# Patient Record
Sex: FEMALE | URBAN - METROPOLITAN AREA
[De-identification: names, ages, dates, MRNs, and addresses within clinical notes are randomized per-mention and may not be internally consistent; named-entity substitution may affect disease eponyms.]

---

## 2023-11-19 ENCOUNTER — HOSPITAL ENCOUNTER (EMERGENCY)
Age: 33
Discharge: HOME | End: 2023-11-19
Attending: EMERGENCY MEDICINE
Payer: MEDICAID

## 2023-11-19 ENCOUNTER — APPOINTMENT (OUTPATIENT)
Dept: ULTRASOUND IMAGING | Age: 33
End: 2023-11-19
Payer: MEDICAID

## 2023-11-19 VITALS
RESPIRATION RATE: 18 BRPM | TEMPERATURE: 97.2 F | OXYGEN SATURATION: 100 % | SYSTOLIC BLOOD PRESSURE: 109 MMHG | HEIGHT: 68 IN | HEART RATE: 73 BPM | WEIGHT: 158.73 LBS | BODY MASS INDEX: 24.06 KG/M2 | DIASTOLIC BLOOD PRESSURE: 71 MMHG

## 2023-11-19 DIAGNOSIS — O20.0 THREATENED MISCARRIAGE IN EARLY PREGNANCY: Primary | ICD-10-CM

## 2023-11-19 LAB
ABO + RH BLD: NORMAL
ANION GAP SERPL CALCULATED.3IONS-SCNC: 8 MMOL/L (ref 9–17)
B-HCG SERPL EIA 3RD IS-ACNC: 1241 MIU/ML
BASOPHILS # BLD: 0 K/UL (ref 0–0.2)
BASOPHILS NFR BLD: 0 % (ref 0–2)
BILIRUB UR QL STRIP: NEGATIVE
BUN SERPL-MCNC: 8 MG/DL (ref 6–20)
CALCIUM SERPL-MCNC: 8.6 MG/DL (ref 8.6–10.4)
CANDIDA SPECIES: NEGATIVE
CHARACTER UR: ABNORMAL
CHLORIDE SERPL-SCNC: 103 MMOL/L (ref 98–107)
CLARITY UR: CLEAR
CO2 SERPL-SCNC: 24 MMOL/L (ref 20–31)
COLOR UR: YELLOW
CREAT SERPL-MCNC: 0.7 MG/DL (ref 0.5–0.9)
EOSINOPHIL # BLD: 0.1 K/UL (ref 0–0.4)
EOSINOPHILS RELATIVE PERCENT: 2 % (ref 1–4)
EPI CELLS #/AREA URNS HPF: ABNORMAL /HPF (ref 0–5)
ERYTHROCYTE [DISTWIDTH] IN BLOOD BY AUTOMATED COUNT: 12.5 % (ref 12.5–15.4)
GARDNERELLA VAGINALIS: NEGATIVE
GFR SERPL CREATININE-BSD FRML MDRD: >60 ML/MIN/1.73M2
GLUCOSE SERPL-MCNC: 92 MG/DL (ref 70–99)
GLUCOSE UR STRIP-MCNC: NEGATIVE MG/DL
HCT VFR BLD AUTO: 37.7 % (ref 36–46)
HGB BLD-MCNC: 13 G/DL (ref 12–16)
HGB UR QL STRIP.AUTO: ABNORMAL
KETONES UR STRIP-MCNC: ABNORMAL MG/DL
LEUKOCYTE ESTERASE UR QL STRIP: NEGATIVE
LYMPHOCYTES NFR BLD: 1.7 K/UL (ref 1–4.8)
LYMPHOCYTES RELATIVE PERCENT: 34 % (ref 24–44)
MCH RBC QN AUTO: 32.2 PG (ref 26–34)
MCHC RBC AUTO-ENTMCNC: 34.5 G/DL (ref 31–37)
MCV RBC AUTO: 93.1 FL (ref 80–100)
MONOCYTES NFR BLD: 0.3 K/UL (ref 0.1–1.2)
MONOCYTES NFR BLD: 6 % (ref 2–11)
NEUTROPHILS NFR BLD: 58 % (ref 36–66)
NEUTS SEG NFR BLD: 3 K/UL (ref 1.8–7.7)
NITRITE UR QL STRIP: NEGATIVE
PH UR STRIP: 6 [PH] (ref 5–8)
PLATELET # BLD AUTO: 253 K/UL (ref 140–450)
PMV BLD AUTO: 8.2 FL (ref 6–12)
POTASSIUM SERPL-SCNC: 4 MMOL/L (ref 3.7–5.3)
PROT UR STRIP-MCNC: NEGATIVE MG/DL
RBC # BLD AUTO: 4.05 M/UL (ref 4–5.2)
RBC #/AREA URNS HPF: ABNORMAL /HPF (ref 0–2)
SODIUM SERPL-SCNC: 135 MMOL/L (ref 135–144)
SOURCE: NORMAL
SP GR UR STRIP: 1.02 (ref 1–1.03)
TRICHOMONAS: NEGATIVE
UROBILINOGEN UR STRIP-ACNC: NORMAL EU/DL (ref 0–1)
WBC #/AREA URNS HPF: ABNORMAL /HPF (ref 0–5)
WBC OTHER # BLD: 5.1 K/UL (ref 3.5–11)

## 2023-11-19 PROCEDURE — 36415 COLL VENOUS BLD VENIPUNCTURE: CPT

## 2023-11-19 PROCEDURE — 87491 CHLMYD TRACH DNA AMP PROBE: CPT

## 2023-11-19 PROCEDURE — 81001 URINALYSIS AUTO W/SCOPE: CPT

## 2023-11-19 PROCEDURE — 87510 GARDNER VAG DNA DIR PROBE: CPT

## 2023-11-19 PROCEDURE — 86901 BLOOD TYPING SEROLOGIC RH(D): CPT

## 2023-11-19 PROCEDURE — 80048 BASIC METABOLIC PNL TOTAL CA: CPT

## 2023-11-19 PROCEDURE — 99284 EMERGENCY DEPT VISIT MOD MDM: CPT

## 2023-11-19 PROCEDURE — 87480 CANDIDA DNA DIR PROBE: CPT

## 2023-11-19 PROCEDURE — 87591 N.GONORRHOEAE DNA AMP PROB: CPT

## 2023-11-19 PROCEDURE — 85025 COMPLETE CBC W/AUTO DIFF WBC: CPT

## 2023-11-19 PROCEDURE — 86900 BLOOD TYPING SEROLOGIC ABO: CPT

## 2023-11-19 PROCEDURE — 87660 TRICHOMONAS VAGIN DIR PROBE: CPT

## 2023-11-19 PROCEDURE — 84702 CHORIONIC GONADOTROPIN TEST: CPT

## 2023-11-19 PROCEDURE — 76817 TRANSVAGINAL US OBSTETRIC: CPT

## 2023-11-19 ASSESSMENT — PAIN - FUNCTIONAL ASSESSMENT: PAIN_FUNCTIONAL_ASSESSMENT: NONE - DENIES PAIN

## 2023-11-19 NOTE — DISCHARGE INSTRUCTIONS
Home. Follow up with Dr. Frank Rabago. Call the office on Monday to be seen and repeat ultrasound. Pelvic rest. Return for bleeding that worsens, nausea, vomiting, severe abdominal pain, or any other concerns.

## 2023-11-20 LAB
C TRACH DNA SPEC QL PROBE+SIG AMP: NEGATIVE
N GONORRHOEA DNA SPEC QL PROBE+SIG AMP: NEGATIVE
SPECIMEN DESCRIPTION: NORMAL

## 2023-11-21 ENCOUNTER — HOSPITAL ENCOUNTER (OUTPATIENT)
Age: 33
Discharge: HOME | End: 2023-11-21
Payer: MEDICAID

## 2023-11-21 DIAGNOSIS — O20.0 THREATENED MISCARRIAGE IN EARLY PREGNANCY: ICD-10-CM

## 2023-11-21 LAB — B-HCG SERPL EIA 3RD IS-ACNC: 1516 MIU/ML

## 2023-11-21 PROCEDURE — 36415 COLL VENOUS BLD VENIPUNCTURE: CPT

## 2023-11-21 PROCEDURE — 84702 CHORIONIC GONADOTROPIN TEST: CPT

## 2025-05-13 ENCOUNTER — OFFICE VISIT (OUTPATIENT)
Age: 35
End: 2025-05-13
Payer: MEDICAID

## 2025-05-13 VITALS
TEMPERATURE: 98.3 F | DIASTOLIC BLOOD PRESSURE: 77 MMHG | HEART RATE: 104 BPM | BODY MASS INDEX: 27.47 KG/M2 | SYSTOLIC BLOOD PRESSURE: 112 MMHG | OXYGEN SATURATION: 99 % | WEIGHT: 175 LBS | HEIGHT: 67 IN

## 2025-05-13 DIAGNOSIS — D23.9 DERMATOFIBROMA: Primary | ICD-10-CM

## 2025-05-13 DIAGNOSIS — D22.9 MULTIPLE BENIGN NEVI: ICD-10-CM

## 2025-05-13 DIAGNOSIS — L21.9 SEBORRHEIC DERMATITIS: ICD-10-CM

## 2025-05-13 PROCEDURE — 99204 OFFICE O/P NEW MOD 45 MIN: CPT | Performed by: PHYSICIAN ASSISTANT

## 2025-05-13 PROCEDURE — 99203 OFFICE O/P NEW LOW 30 MIN: CPT | Performed by: PHYSICIAN ASSISTANT

## 2025-05-13 RX ORDER — KETOCONAZOLE 20 MG/ML
SHAMPOO, SUSPENSION TOPICAL
Qty: 120 ML | Refills: 5 | Status: SHIPPED | OUTPATIENT
Start: 2025-05-13

## 2025-05-13 NOTE — PROGRESS NOTES
Dermatology Patient Note  [facility]  [facility] DERMATOLOGY  [address] [place] [address]  Dept: [phone]  Dept Fax: [phone]      VISITDATE: 5/13/2025   REFERRING PROVIDER: No ref. provider found      Katie Stallings is a 34 y.o. female  who presents today in the office for:    New Patient (New patient presents today for a mole check. No hx of skin cancer.)      HISTORY OF PRESENT ILLNESS:  As above.    MEDICAL PROBLEMS:  There are no active problems to display for this patient.      CURRENT MEDICATIONS:   Current Outpatient Medications   Medication Sig Dispense Refill    ketoconazole (NIZORAL) 2 % shampoo Apply 2-3 times weekly to scalp, leave on for five minutes prior to washing off. 120 mL 5     No current facility-administered medications for this visit.       ALLERGIES:   No Known Allergies    SOCIAL HISTORY:  Social History     Tobacco Use    Smoking status: Former     Types: Cigarettes    Smokeless tobacco: Never   Substance Use Topics    Alcohol use: Yes     Alcohol/week: 5.0 standard drinks of alcohol     Types: 5 Standard drinks or equivalent per week       Pertinent ROS:  Review of Systems  Skin: Denies any new changing, growing or bleeding lesions or rashes except as described in the HPI   Constitutional: Denies fevers, chills, and malaise.    PHYSICAL EXAM:   /77   Pulse (!) 104   Temp 98.3 °F (36.8 °C)   Ht 1.702 m (5' 7\")   Wt 79.4 kg (175 lb)   LMP 06/06/2024 (Exact Date)   SpO2 99%   Breastfeeding Unknown   BMI 27.41 kg/m²     The patient is generally well appearing, well nourished, alert and conversational. Affect is normal.    Cutaneous Exam:  Physical Exam  Total body skin exam excluding external genitalia: head/face, neck, both arms, chest, back, abdomen, both legs, buttocks, digits and/or nails, was examined. Genital exam was deferred as patient denied having any lesions in this area. Complete visualization of scalp may be